# Patient Record
Sex: MALE | Race: WHITE
[De-identification: names, ages, dates, MRNs, and addresses within clinical notes are randomized per-mention and may not be internally consistent; named-entity substitution may affect disease eponyms.]

---

## 2021-02-18 ENCOUNTER — HOSPITAL ENCOUNTER (EMERGENCY)
Dept: HOSPITAL 76 - ED | Age: 71
Discharge: HOME | End: 2021-02-18
Payer: COMMERCIAL

## 2021-02-18 VITALS — SYSTOLIC BLOOD PRESSURE: 141 MMHG | DIASTOLIC BLOOD PRESSURE: 85 MMHG

## 2021-02-18 DIAGNOSIS — I10: ICD-10-CM

## 2021-02-18 DIAGNOSIS — Z87.891: ICD-10-CM

## 2021-02-18 DIAGNOSIS — I48.0: Primary | ICD-10-CM

## 2021-02-18 LAB
ALBUMIN DIAFP-MCNC: 4.3 G/DL (ref 3.2–5.5)
ALBUMIN/GLOB SERPL: 2 {RATIO} (ref 1–2.2)
ALP SERPL-CCNC: 38 IU/L (ref 42–121)
ALT SERPL W P-5'-P-CCNC: 33 IU/L (ref 10–60)
ANION GAP SERPL CALCULATED.4IONS-SCNC: 14 MMOL/L (ref 6–13)
AST SERPL W P-5'-P-CCNC: 32 IU/L (ref 10–42)
BASOPHILS NFR BLD AUTO: 0.1 10^3/UL (ref 0–0.1)
BASOPHILS NFR BLD AUTO: 0.6 %
BILIRUB BLD-MCNC: 1.4 MG/DL (ref 0.2–1)
BUN SERPL-MCNC: 25 MG/DL (ref 6–20)
CALCIUM UR-MCNC: 9.3 MG/DL (ref 8.5–10.3)
CHLORIDE SERPL-SCNC: 100 MMOL/L (ref 101–111)
CO2 SERPL-SCNC: 23 MMOL/L (ref 21–32)
CREAT SERPLBLD-SCNC: 1.1 MG/DL (ref 0.6–1.2)
EOSINOPHIL # BLD AUTO: 0.1 10^3/UL (ref 0–0.7)
EOSINOPHIL NFR BLD AUTO: 0.9 %
ERYTHROCYTE [DISTWIDTH] IN BLOOD BY AUTOMATED COUNT: 11.7 % (ref 12–15)
GLOBULIN SER-MCNC: 2.1 G/DL (ref 2.1–4.2)
GLUCOSE SERPL-MCNC: 108 MG/DL (ref 70–100)
HGB UR QL STRIP: 15.4 G/DL (ref 14–18)
LIPASE SERPL-CCNC: 32 U/L (ref 22–51)
LYMPHOCYTES # SPEC AUTO: 2 10^3/UL (ref 1.5–3.5)
LYMPHOCYTES NFR BLD AUTO: 23.2 %
MCH RBC QN AUTO: 32 PG (ref 27–31)
MCHC RBC AUTO-ENTMCNC: 34.1 G/DL (ref 32–36)
MCV RBC AUTO: 93.8 FL (ref 80–94)
MONOCYTES # BLD AUTO: 0.6 10^3/UL (ref 0–1)
MONOCYTES NFR BLD AUTO: 7 %
NEUTROPHILS # BLD AUTO: 5.8 10^3/UL (ref 1.5–6.6)
NEUTROPHILS # SNV AUTO: 8.5 X10^3/UL (ref 4.8–10.8)
NEUTROPHILS NFR BLD AUTO: 68.1 %
PDW BLD AUTO: 10.3 FL (ref 7.4–11.4)
PLATELET # BLD: 265 10^3/UL (ref 130–450)
PROT SPEC-MCNC: 6.4 G/DL (ref 6.7–8.2)
RBC MAR: 4.82 10^6/UL (ref 4.7–6.1)

## 2021-02-18 PROCEDURE — 85025 COMPLETE CBC W/AUTO DIFF WBC: CPT

## 2021-02-18 PROCEDURE — 83690 ASSAY OF LIPASE: CPT

## 2021-02-18 PROCEDURE — 93005 ELECTROCARDIOGRAM TRACING: CPT

## 2021-02-18 PROCEDURE — 99284 EMERGENCY DEPT VISIT MOD MDM: CPT

## 2021-02-18 PROCEDURE — 84484 ASSAY OF TROPONIN QUANT: CPT

## 2021-02-18 PROCEDURE — 36415 COLL VENOUS BLD VENIPUNCTURE: CPT

## 2021-02-18 PROCEDURE — 96360 HYDRATION IV INFUSION INIT: CPT

## 2021-02-18 PROCEDURE — 80053 COMPREHEN METABOLIC PANEL: CPT

## 2021-02-18 NOTE — ED PHYSICIAN DOCUMENTATION
History of Present Illness





- Stated complaint


Stated Complaint: LOW BP, IRREGULAR HB





- Chief complaint


Chief Complaint: Cardiac





- History obtained from


History obtained from: Patient





- History of Present Illness


Timing: Today


Pain level max: 0


Pain level now: 0





- Additonal information


Additional information: 





Patient is a 70-year-old male who presents to the emergency department 

complaining of an irregular heartbeat today.  States that his pulse was very 

fast.  Has a history of atrial fibrillation in the past and this felt similar.  

no chest pain. no shortness of breath Did feel mildly lightheaded.  He is 

currently on a ketogenic diet.  Nothing makes it better or worse





Review of Systems


Ten Systems: 10 systems reviewed and negative


Constitutional: denies: Fever, Chills


Nose: denies: Rhinorrhea / runny nose, Congestion


Throat: denies: Sore throat


Cardiac: reports: Palpitations


GI: denies: Abdominal Pain, Nausea, Vomiting, Diarrhea


Skin: denies: Rash


Musculoskeletal: denies: Neck pain, Back pain


Neurologic: denies: Headache





PD PAST MEDICAL HISTORY





- Past Medical History


Past Medical History: Yes


Cardiovascular: Hypertension, Atrial fibrillation





- Present Medications


Home Medications: 


                                Ambulatory Orders











 Medication  Instructions  Recorded  Confirmed


 


Cyclosporine [Restasis] 1 drops TOP DAILY 02/18/21 02/18/21


 


Dutasteride [Avodart] 0.5 mg PO DAILY 02/18/21 02/18/21


 


Lisinopril [Zestril] 20 mg PO DAILY 02/18/21 02/18/21


 


Mirabegron [Myrbetriq] 1 tab PO DAILY 02/18/21 02/18/21


 


Valacyclovir HCl [Valtrex] 1 tab PO DAILY 02/18/21 02/18/21


 


diltiaZEM [Cardizem] 30 mg PO Q6H PRN #10 02/18/21 














- Allergies


Allergies/Adverse Reactions: 


                                    Allergies











Allergy/AdvReac Type Severity Reaction Status Date / Time


 


No Known Drug Allergies Allergy   Verified 02/18/21 15:27














- Social History


Does the pt smoke?: No


Smoking Status: Former smoker


Does the pt drink ETOH?: Yes


ETOH Use: Beer


Does the pt have substance abuse?: Yes


Substance Use and Type: Marijuana





- Immunizations


Immunizations are current?: Yes





PD ED PE NORMAL





- Vitals


Vital signs reviewed: Yes





- General


General: Alert and oriented X 3, No acute distress, Well developed/nourished





- HEENT


HEENT: Moist mucous membranes





- Neck


Neck: Supple, no meningeal sign





- Cardiac


Cardiac: Other (Irregular, tachycardic)





- Respiratory


Respiratory: No respiratory distress, Clear bilaterally





- Abdomen


Abdomen: Soft, Non tender, Non distended





- Back


Back: No CVA TTP, No spinal TTP





- Derm


Derm: Warm and dry





- Extremities


Extremities: No edema, No calf tenderness / cord





- Neuro


Neuro: Alert and oriented X 3





- Psych


Psych: Normal mood, Normal affect





Results





- Vitals


Vitals: 


                               Vital Signs - 24 hr











  02/18/21 02/18/21 02/18/21





  15:27 15:53 16:12


 


Temperature 36.5 C  


 


Heart Rate 84 133 H 79


 


Respiratory 16 16 10 L





Rate   


 


Blood Pressure 159/97 H 118/87 H 141/79 H


 


O2 Saturation 96 96 96














  02/18/21





  16:57


 


Temperature 


 


Heart Rate 62


 


Respiratory 16





Rate 


 


Blood Pressure 141/85 H


 


O2 Saturation 96








                                     Oxygen











O2 Source                      Room air

















- EKG (time done)


  ** 1521


Rate: Rate (enter#) (136)


Rhythm: Atrial fibrillation


Axis: Normal


QRS: Normal


Ischemia: Normal ST segments





- Labs


Labs: 


                                Laboratory Tests











  02/18/21 02/18/21 02/18/21





  15:23 15:23 15:23


 


WBC  8.5  


 


RBC  4.82  


 


Hgb  15.4  


 


Hct  45.2  


 


MCV  93.8  


 


MCH  32.0 H  


 


MCHC  34.1  


 


RDW  11.7 L  


 


Plt Count  265  


 


MPV  10.3  


 


Neut # (Auto)  5.8  


 


Lymph # (Auto)  2.0  


 


Mono # (Auto)  0.6  


 


Eos # (Auto)  0.1  


 


Baso # (Auto)  0.1  


 


Absolute Nucleated RBC  0.00  


 


Nucleated RBC %  0.0  


 


Sodium   137 


 


Potassium   4.4 


 


Chloride   100 L 


 


Carbon Dioxide   23 


 


Anion Gap   14.0 H 


 


BUN   25 H 


 


Creatinine   1.1 


 


Estimated GFR (MDRD)   67 L 


 


Glucose   108 H 


 


Calcium   9.3 


 


Total Bilirubin   1.4 H 


 


AST   32 


 


ALT   33 


 


Alkaline Phosphatase   38 L 


 


Troponin I High Sens    12.4


 


Total Protein   6.4 L 


 


Albumin   4.3 


 


Globulin   2.1 


 


Albumin/Globulin Ratio   2.0 


 


Lipase   32 














- Rads (name of study)


  ** Chest x-ray


Radiology: Prelim report reviewed, EMP read contemporaneously, See rad report 

(No acute abnormality)





PD MEDICAL DECISION MAKING





- ED course


Complexity details: reviewed results, re-evaluated patient, considered 

differential (No ST elevation MI, no aortic dissection, no PE, no tension 

pneumothorax, no aortic aneurysm), d/w patient


ED course: 





Patient with paroxysmal atrial fibrillation. This has occurred to him 

previously. Spontaneously converted in the emergency department prior to 

administration of diltiazem.    No acute findings on laboratory testing.  

Symptoms resolved.  Patient feels better.  We will have him follow-up with his 

doctor for a Holter monitor and further care.  Will prescribe as needed 

diltiazem and have him start on aspirin daily.  Patient counseled regarding 

signs and symptoms for which I believe and urgent re-evaluation would be 

necessary. Patient with good understanding of and agreement to plan and is 

comfortable going home at this time





This document was made in part using voice recognition software. While efforts 

are made to proofread this document, sound alike and grammatical errors may 

occur.





Departure





- Departure


Disposition: 01 Home, Self Care


Clinical Impression: 


 Atrial fibrillation with rapid ventricular response





Condition: Good


Instructions:  ED Afib


Follow-Up: 


Enrique Emerson MD [Primary Care Provider] - Within 1 week


Prescriptions: 


diltiaZEM [Cardizem] 30 mg PO Q6H PRN #10


 PRN Reason: atrial fibrillation


Comments: 


Follow-up with your doctor for further care.  They will likely want to put you 

on a heart monitor so they can determine the amount of time that you are in 

atrial fibrillation as this will guide your management.  You should start on a 

baby aspirin daily as well. Return if you worsen.  You can use the cardizem if 

you feel this happen again. 


Discharge Date/Time: 02/18/21 17:37

## 2021-02-18 NOTE — XRAY REPORT
PROCEDURE:  Chest 1 View X-Ray

 

INDICATIONS:  Chest Pain

 

TECHNIQUE:  One view of the chest was acquired.  

 

COMPARISON:  None

 

FINDINGS:  

 

Surgical changes and devices:  None.  

 

Lungs and pleura:  No pleural effusions or pneumothorax.  Lungs are clear.  

 

Mediastinum:  Mediastinal contours appear normal.  Heart size is normal.  

 

Bones and chest wall:  No suspicious bony lesions.  Overlying soft tissues appear unremarkable.  

 

IMPRESSION:  

No acute cardiopulmonary process demonstrated radiographically.

 

Reviewed by: Deon Hniton MD on 2/18/2021 4:15 PM PST

Approved by: Deon Hinton MD on 2/18/2021 4:15 PM [facility]

 

 

Station ID:  [id number]

## 2021-07-06 ENCOUNTER — HOSPITAL ENCOUNTER (OUTPATIENT)
Dept: HOSPITAL 76 - COV | Age: 71
Discharge: HOME | End: 2021-07-06
Attending: UROLOGY
Payer: COMMERCIAL

## 2021-07-06 DIAGNOSIS — Z20.822: ICD-10-CM

## 2021-07-06 DIAGNOSIS — Z01.812: Primary | ICD-10-CM
